# Patient Record
(demographics unavailable — no encounter records)

---

## 2025-07-07 NOTE — HISTORY OF PRESENT ILLNESS
[Mother] : mother [Yes] : Patient goes to dentist yearly [Tap water] : Primary Fluoride Source: Tap water [Up to date] : Up to date [Normal] : normal [Has family members/adults to turn to for help] : has family members/adults to turn to for help [Grade: ____] : Grade: [unfilled] [Has friends] : has friends [Has interests/participates in community activities/volunteers] : has interests/participates in community activities/volunteers. [No] : No cigarette smoke exposure [Uses safety belts/safety equipment] : uses safety belts/safety equipment  [Has ways to cope with stress] : has ways to cope with stress [Displays self-confidence] : displays self-confidence [Has problems with sleep] : has problems with sleep [With Teen] : teen [Eats regular meals including adequate fruits and vegetables] : does not eat regular meals including adequate fruits and vegetables [Drinks non-sweetened liquids] : does not drink non-sweetened liquids  [Uses electronic nicotine delivery system] : does not use electronic nicotine delivery system [Uses tobacco] : does not use tobacco [Uses drugs] : does not use drugs  [Drinks alcohol] : does not drink alcohol [Impaired/distracted driving] : no impaired/distracted driving [Has thought about hurting self or considered suicide] : has not thought about hurting self or considered suicide [FreeTextEntry7] : Well visit [de-identified] : Anemia concerns. Not recent bloodwork Need eye follow up Follows with ENT for recurrent wax build up right ear Concerns about hx of high cholesterol. No recent lab work done in past year

## 2025-07-07 NOTE — PHYSICAL EXAM
[Alert] : alert [No Acute Distress] : no acute distress [Normocephalic] : normocephalic [EOMI Bilateral] : EOMI bilateral [Clear tympanic membranes with bony landmarks and light reflex present bilaterally] : clear tympanic membranes with bony landmarks and light reflex present bilaterally  [Pink Nasal Mucosa] : pink nasal mucosa [Nonerythematous Oropharynx] : nonerythematous oropharynx [Supple, full passive range of motion] : supple, full passive range of motion [No Palpable Masses] : no palpable masses [Clear to Auscultation Bilaterally] : clear to auscultation bilaterally [Regular Rate and Rhythm] : regular rate and rhythm [Normal S1, S2 audible] : normal S1, S2 audible [No Murmurs] : no murmurs [Soft] : soft [NonTender] : non tender [Non Distended] : non distended [Normoactive Bowel Sounds] : normoactive bowel sounds [No Hepatomegaly] : no hepatomegaly [No Splenomegaly] : no splenomegaly [Higinio: _____] : Higinio [unfilled] [No Abnormal Lymph Nodes Palpated] : no abnormal lymph nodes palpated [Normal Muscle Tone] : normal muscle tone [No Gait Asymmetry] : no gait asymmetry [No pain or deformities with palpation of bone, muscles, joints] : no pain or deformities with palpation of bone, muscles, joints [Straight] : straight [Cranial Nerves Grossly Intact] : cranial nerves grossly intact [No Rash or Lesions] : no rash or lesions

## 2025-07-28 NOTE — PHYSICAL EXAM
[Alert] : alert [Oriented x 3] : ~L oriented x 3 [Well Nourished] : well nourished [Declined] : declined [FreeTextEntry3] : Focused skin exam performed The relevant portions of the exam were performed today AAOx3, NAD, well-appearing / pleasant Focused examination within normal limits with the exception of:  - follicular hyperkeratotic papules on the R cheek within a brown hyperpigmented plaque - several scattered comedones and inflammatory papules on the face, mostly on the forehead - no scale in scalp

## 2025-07-28 NOTE — ASSESSMENT
[Use of independent historian: [ enter independent historian's relationship to patient ] :____] : As the patient was unable to provide a complete and reliable history, I obtained clinical history from the patient's [unfilled] [FreeTextEntry1] : 1. Acne vulgaris, moderate comedonal with inflammatory component, face Chronic, flaring despite tretinoin use nightly - Diagnosis and course of condition discussed - Switch from tretinoin to tazarotene 0.1% cream nightly. SED. - Discussed liberal use of non-comedogenic moisturizers.  2. Hyperpigmented plaque on cheek with hyperkeratotic papules, DDx includes follicular hyperkeratosis of the cheek vs. lichen spinulosus  Follicular hyperkeratosis is a disorder of the skin thought to be secondary to repetitive friction Chronic, persistent, refractory to calcitriol - STOP calcitriol - START tazarotene 0.1% cream as above   3. Seb derm - chronic; improved  - C/w maintanence keto shampoo  4. Hyperpigmentation of knees and elbows, physiologic Mild hyperkeratosis as well - Educated about benign nature, expectations - START Amlactin OTC lotion  RTC 6 months

## 2025-07-28 NOTE — HISTORY OF PRESENT ILLNESS
[FreeTextEntry1] : RPV: acne, hyperpigmentation [de-identified] : Ms. MALLIKA VELAZCO is a 14 year old F who presents with mom for:  1. Acne, still active on forehead, +scars on cheeks. Believes acne is improving with tretinoin but still room for improvement. 2. Hyperpigmented patch on right cheek - stable, not improving with calcitriol or tretinoin, stopped using calcitriol. 3. Seborrheic dermatitis - resolved, using intermittent ketoconazole 4. Hyperpigmentation of knees and elbows  Social hx: in 10th grade